# Patient Record
Sex: MALE | Race: WHITE | NOT HISPANIC OR LATINO | Employment: FULL TIME | URBAN - NONMETROPOLITAN AREA
[De-identification: names, ages, dates, MRNs, and addresses within clinical notes are randomized per-mention and may not be internally consistent; named-entity substitution may affect disease eponyms.]

---

## 2019-08-19 ENCOUNTER — OFFICE VISIT (OUTPATIENT)
Dept: OTOLARYNGOLOGY | Facility: CLINIC | Age: 59
End: 2019-08-19

## 2019-08-19 ENCOUNTER — CLINICAL SUPPORT (OUTPATIENT)
Dept: AUDIOLOGY | Facility: CLINIC | Age: 59
End: 2019-08-19

## 2019-08-19 VITALS — WEIGHT: 278.8 LBS | HEIGHT: 76 IN | TEMPERATURE: 97.8 F | BODY MASS INDEX: 33.95 KG/M2

## 2019-08-19 DIAGNOSIS — IMO0001 BILATERAL ASYMMETRIC SENSORINEURAL HEARING LOSS: Primary | ICD-10-CM

## 2019-08-19 DIAGNOSIS — H90.A22 SENSORINEURAL HEARING LOSS (SNHL) OF LEFT EAR WITH RESTRICTED HEARING OF RIGHT EAR: Primary | ICD-10-CM

## 2019-08-19 DIAGNOSIS — H93.13 TINNITUS OF BOTH EARS: ICD-10-CM

## 2019-08-19 DIAGNOSIS — H93.13 TINNITUS, BILATERAL: ICD-10-CM

## 2019-08-19 PROCEDURE — 99203 OFFICE O/P NEW LOW 30 MIN: CPT | Performed by: OTOLARYNGOLOGY

## 2019-08-19 PROCEDURE — 92567 TYMPANOMETRY: CPT | Performed by: AUDIOLOGIST

## 2019-08-19 PROCEDURE — 92557 COMPREHENSIVE HEARING TEST: CPT | Performed by: AUDIOLOGIST

## 2019-08-19 RX ORDER — PREDNISONE 20 MG/1
TABLET ORAL
Qty: 18 TABLET | Refills: 0 | Status: SHIPPED | OUTPATIENT
Start: 2019-08-19 | End: 2019-09-06
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
## 2019-08-19 NOTE — PATIENT INSTRUCTIONS
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
## 2019-09-06 ENCOUNTER — OFFICE VISIT (OUTPATIENT)
Dept: OTOLARYNGOLOGY | Facility: CLINIC | Age: 59
End: 2019-09-06

## 2019-09-06 ENCOUNTER — CLINICAL SUPPORT (OUTPATIENT)
Dept: AUDIOLOGY | Facility: CLINIC | Age: 59
End: 2019-09-06

## 2019-09-06 VITALS — WEIGHT: 278.4 LBS | HEIGHT: 76 IN | TEMPERATURE: 97.6 F | BODY MASS INDEX: 33.9 KG/M2

## 2019-09-06 DIAGNOSIS — H93.13 TINNITUS OF BOTH EARS: ICD-10-CM

## 2019-09-06 DIAGNOSIS — H90.A22 SENSORINEURAL HEARING LOSS (SNHL) OF LEFT EAR WITH RESTRICTED HEARING OF RIGHT EAR: Primary | ICD-10-CM

## 2019-09-06 DIAGNOSIS — IMO0001 BILATERAL ASYMMETRIC SENSORINEURAL HEARING LOSS: Primary | ICD-10-CM

## 2019-09-06 DIAGNOSIS — H93.13 TINNITUS, BILATERAL: ICD-10-CM

## 2019-09-06 PROCEDURE — 92567 TYMPANOMETRY: CPT | Performed by: AUDIOLOGIST

## 2019-09-06 PROCEDURE — 92557 COMPREHENSIVE HEARING TEST: CPT | Performed by: AUDIOLOGIST

## 2019-09-06 PROCEDURE — 99213 OFFICE O/P EST LOW 20 MIN: CPT | Performed by: OTOLARYNGOLOGY

## 2019-09-06 NOTE — PROGRESS NOTES
STANDARD AUDIOMETRIC EVALUATION      Name:  Beni Quezada  :  1960  Age:  58 y.o.  Date of Evaluation:  2019      HISTORY    Reason for visit:  Beni Quezada was seen today for a hearing evaluation at the request of Dr. Rashaad Latif.   Mr. Quezada was seen approximately two weeks ago with complaints of otitis media and aural pressure in the right ear and constant ringing tinnitus. Dr. Latif prescribed oral medication which reportedly improved Mr. Quezada's perceived hearing sensitivity bilaterally. He brought in a copy of his occupational hearing test that was completed in 2015. A review of the hearing test revealed no significant changes in hearing sensitivity when compared to the hearing test completed approximately two weeks ago. Mr. Quezada still suffers from constant ringing tinnitus in the left ear, but reported that it is not bothersome. No other significant audiologic information was reported.      EVALUATION    See Audiogram    RESULTS        Otoscopy and Tympanometry 226 Hz :  Right Ear:  Otoscopy:  Clear ear canal          Tympanometry:  Unable to test due to no seal    Left Ear:   Otoscopy:  Clear ear canal        Tympanometry:  Unable to test due to no seal    Test technique:  Standard Audiometry     Pure Tone Audiometry:   Patient responded to pure tones at 15-60 dB for 250-8000 Hz in right ear, and at 30-75 dB for 250-8000 Hz in left ear.       Speech Audiometry:        Right Ear:  Speech Reception Threshold (SRT) was obtained at 30 dBHL                 Speech Discrimination scores were 84% in quiet when words were presented at 70 dBHL       Left Ear:  Speech Reception Threshold (SRT) was obtained at 50 dBHL                 Speech Discrimination scores were 72% in quiet when words were presented at 75 dBHL the patient's MCL      Reliability:   excellent    IMPRESSIONS:  1.  Tympanometry results were not obtained due to the inability to get a seal bilaterally.  2.   Pure tone results are consistent with normal peripheral hearing sensitivity through 1000 Hz sloping to a moderately-severe sensorineural hearing loss in the right ear and a mild sloping to severe sensorineural hearing loss in the left ear. Asymmetries were noted at 250, 1000, 6000 and 8000 Hz with the left ear being the poorer ear. A very minimal improvement was seen when compared to previous test results.       RECOMMENDATIONS:  Patient is seeing the Ear Nose and Throat physician immediately following this examination.  It was a pleasure seeing Beni Quezada in Audiology today.  We would be happy to do further testing or discuss these test as necessary.              This document has been electronically signed by EMMY Warner on September 6, 2019 2:28 PM

## 2019-09-06 NOTE — PATIENT INSTRUCTIONS
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
## 2019-09-09 ENCOUNTER — LAB (OUTPATIENT)
Dept: LAB | Facility: HOSPITAL | Age: 59
End: 2019-09-09

## 2019-09-09 DIAGNOSIS — H93.13 TINNITUS, BILATERAL: ICD-10-CM

## 2019-09-09 DIAGNOSIS — H90.A22 SENSORINEURAL HEARING LOSS (SNHL) OF LEFT EAR WITH RESTRICTED HEARING OF RIGHT EAR: ICD-10-CM

## 2019-09-09 LAB
CREAT BLD-MCNC: 1.31 MG/DL (ref 0.76–1.27)
GFR SERPL CREATININE-BSD FRML MDRD: 56 ML/MIN/1.73

## 2019-09-09 PROCEDURE — 82565 ASSAY OF CREATININE: CPT

## 2019-09-09 PROCEDURE — 36415 COLL VENOUS BLD VENIPUNCTURE: CPT

## 2019-09-12 ENCOUNTER — HOSPITAL ENCOUNTER (OUTPATIENT)
Dept: MRI IMAGING | Facility: HOSPITAL | Age: 59
Discharge: HOME | End: 2019-09-12
Admitting: OTOLARYNGOLOGY

## 2019-09-12 DIAGNOSIS — H93.13 TINNITUS, BILATERAL: ICD-10-CM

## 2019-09-12 DIAGNOSIS — H90.A22 SENSORINEURAL HEARING LOSS (SNHL) OF LEFT EAR WITH RESTRICTED HEARING OF RIGHT EAR: ICD-10-CM

## 2019-09-12 PROCEDURE — A9576 INJ PROHANCE MULTIPACK: HCPCS | Performed by: OTOLARYNGOLOGY

## 2019-09-12 PROCEDURE — 25010000002 GADOTERIDOL PER 1 ML: Performed by: OTOLARYNGOLOGY

## 2019-09-12 PROCEDURE — 70553 MRI BRAIN STEM W/O & W/DYE: CPT

## 2019-09-12 RX ADMIN — GADOTERIDOL 20 ML: 279.3 INJECTION, SOLUTION INTRAVENOUS at 08:53

## 2019-09-13 ENCOUNTER — TELEPHONE (OUTPATIENT)
Dept: OTOLARYNGOLOGY | Facility: CLINIC | Age: 59
End: 2019-09-13

## 2019-09-13 RX ORDER — CEFDINIR 300 MG/1
300 CAPSULE ORAL 2 TIMES DAILY
Qty: 20 CAPSULE | Refills: 0 | Status: SHIPPED | OUTPATIENT
Start: 2019-09-13 | End: 2019-09-23

## 2019-09-13 NOTE — TELEPHONE ENCOUNTER
Spoke with patient and let him know that Dr. Latif had reviewed his MRI and that there is no tumor or anything like that to worry about, but that we have sent in an antibiotic for him to take because there was some inflammation seen.    He is to call our office if he has any questions or concerns before his next appointment.

## 2019-10-03 ENCOUNTER — OFFICE VISIT (OUTPATIENT)
Dept: OTOLARYNGOLOGY | Facility: CLINIC | Age: 59
End: 2019-10-03

## 2019-10-03 VITALS — WEIGHT: 279 LBS | HEIGHT: 76 IN | BODY MASS INDEX: 33.97 KG/M2 | TEMPERATURE: 97.4 F

## 2019-10-03 DIAGNOSIS — H90.A22 SENSORINEURAL HEARING LOSS (SNHL) OF LEFT EAR WITH RESTRICTED HEARING OF RIGHT EAR: Primary | ICD-10-CM

## 2019-10-03 DIAGNOSIS — H93.13 TINNITUS, BILATERAL: ICD-10-CM

## 2019-10-03 DIAGNOSIS — G93.0 CYST OF BRAIN: ICD-10-CM

## 2019-10-03 PROCEDURE — 99213 OFFICE O/P EST LOW 20 MIN: CPT | Performed by: OTOLARYNGOLOGY

## 2019-10-03 NOTE — PATIENT INSTRUCTIONS
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
## 2019-10-10 ENCOUNTER — CLINICAL SUPPORT (OUTPATIENT)
Dept: AUDIOLOGY | Facility: CLINIC | Age: 59
End: 2019-10-10

## 2019-10-10 DIAGNOSIS — IMO0001 BILATERAL ASYMMETRIC SENSORINEURAL HEARING LOSS: Primary | ICD-10-CM

## 2019-10-10 PROCEDURE — HEARINGNOCHG: Performed by: AUDIOLOGIST

## 2019-10-10 NOTE — PROGRESS NOTES
HEARING AID CONSULT    Name:  Beni Quezada  :  1960  Age:  58 y.o.  Date of Evaluation:  10/10/2019      HISTORY    Reason for visit:  Beni Quezada is seen today for a hearing aid consultation at the request of Dr. Rashaad Latif.  A previous audiogram completed on 2019 shows mild to moderate/severe sloping sensorineural hearing loss  for both ears. Asymmetries are present at 1000, 6000 and 8000 Hz with the left ear being the poorer ear, but Mr. Quezada has received medical clearance from Dr. Latif to move forward with amplification. Mr. Quezada currently works at [employer] and has significant difficulty hearing at work. He also notes difficulty hearing in the presence of background noise. Mr. Quezada has not worn hearing aids, but is interested in moving forward with bilateral amplification.    Hearing aid history:  Patient does not have hearing aids at this time.      RECOMMENDATIONS:  During the Hearing Aid discussion, Mr. Quezada reported that he is attempting to receive financial support for hearing aids through worker's compensation. He would also like us to contact his insurance to see if any coverage is provided through his insurance plan. Mr. Quezada is currently working and was given [employer] contact information to set up and appointment with a counselor. All of these plans will be contacted prior to the hearing aids being ordered to see which route works best for funding Mr. Quezada's hearing aids. He stated that he will let us know if he is going to receive worker's compensation or go through vocational rehabilitation. His order will be held until then. The hearing aids recommended are from [employer] with the M50 or M70 digital circuit. Mr. Quezada will decide on technology level when his funding options are presented. He also elected to move forward with the rechargeable option of hearing aids. We will wait to hear from him prior to moving  forward with the next step in the hearing aid fitting process.    Color: P6 Silver gray  2xM  R+L      It was a pleasure seeing Beni Quezada in Audiology today.  I look forward to helping Mr. Quezada with his amplification needs.                        This document has been electronically signed by EMMY Warner on October 10, 2019 3:36 PM

## 2019-11-05 ENCOUNTER — CLINICAL SUPPORT (OUTPATIENT)
Dept: AUDIOLOGY | Facility: CLINIC | Age: 59
End: 2019-11-05

## 2019-11-05 DIAGNOSIS — H90.3 SENSORINEURAL HEARING LOSS, BILATERAL: Primary | ICD-10-CM

## 2019-11-05 PROCEDURE — V5261 HEARING AID, DIGIT, BIN, BTE: HCPCS | Performed by: AUDIOLOGIST

## 2019-11-05 NOTE — PROGRESS NOTES
HEARING AID FITTING    Name:  Beni Quezada  :  1960  Age:  58 y.o.  Date of Evaluation:  2019      HISTORY    Reason for visit:  Beni Quezada was seen today for a hearing aid fitting.  The hearing aids fit were  in the Canal (SARY) hearing aids from [employer] with the JEDI MIND M70-R digital circuit.     Right Hearing Aid Serial Number: [id number]  Left Hearing Aid Serial Number: [id number]    Warranty Expiration:  's warranty extends through 2020 which covers repairs, loss and damage.    Battery Size:  rechargeable    The hearing aids were fit to Mr. Quezada's ears.  The feedback manager was completed and global gain was set to 90%. Mr. Quezada reported that his own voice sounded different, but overall the hearing aids were comfortable with good sound quality. The auto acclimatization manager was set to increase to 100% global gain over the next two weeks. Mr. Quezada was instructed on the use and care of the hearing instruments.  He demonstrated good ability of insertion and removal in office. The necessary paperwork was signed.  All questions were answered at this time.  The cost of this hearing aid is $2900.00 per aid for a total of $5800.00  Patient's insurance will be billed for the cost of the hearing aid(s). Prior to moving forward with ordering the hearing aids, Mr. Hoffmanns insurance reported that they would cover 85% per ear since his deductible had been met. The estimated cost of coverage is $4930.00 leaving Mr. Quezada with an $870.00 out of pocket cost. The $870.00 was collected at the time of the fitting through [employer] [facility].     Mr. Quezada will return in 2 weeks for a hearing aid follow up.  At that time we will make adjustments to the hearing aid(s) and address problems as necessary.      It was a pleasure seeing Beni Quezada in Audiology today.  It is a pleasure helping Mr. Quezada with his amplification needs.             This document has been  electronically signed by EMMY Warner on November 5, 2019 2:13 PM       For billing and coding: , binaural digital BTE fitting, $5800.00

## 2019-11-19 ENCOUNTER — CLINICAL SUPPORT (OUTPATIENT)
Dept: AUDIOLOGY | Facility: CLINIC | Age: 59
End: 2019-11-19

## 2019-11-19 DIAGNOSIS — H90.3 SENSORINEURAL HEARING LOSS, BILATERAL: Primary | ICD-10-CM

## 2019-11-19 PROCEDURE — HEARINGNOCHG: Performed by: AUDIOLOGIST

## 2019-11-19 NOTE — PROGRESS NOTES
"  HEARING AID CHECK    Name:  Beni Quezada  :  1960  Age:  58 y.o.  Date of Evaluation:  2019      HISTORY    Reason for visit:  Beni Quezada is seen today for a hearing aid check.  He was fit with [employer] [place] rechargeable hearing aids approximately two weeks ago. His auto acclimatization has increased to 100% global gain over the past two weeks. Mr. Quezada noted that approximately 6-7 days ago he started getting significant feedback when objects were near his hearing aids such as his fu or hand. He reported wiggling the dome in his ear could occasionally fix the issue. He reported good comfort of the hearing aids and good sound quality.     Hearing aid history:  Patient is currently wearing a  in the Canal (SARY) hearing aid in both ear(s).     OFFICE VISIT    During today's visit the hearing aids were connected to the software. Data logging revealed the hearing aids were being used approximately 16.7 hours per day. The domes were changed from a large vented dome to a large power dome. Mr. Quezada reported good comfort. The feedback manager was completed and \"whistleblock\" was increased from moderate to a strong setting. Mr. Quezada reported there was no longer feedback coming from the hearing aids when running his hands by them. He was counseled on possibly needing earmolds if this did not fix the issue in the future. Mr. Quezada reported that he is very pleased with the hearing aids and has no intentions on returning them. He was counseled on changing the wax trap and when it will be needed. He will return in three months for follow up or sooner if concerns arise.    It was a pleasure seeing Beni Quezada in Audiology today.  It is a pleasure helping Mr. Quezada with his amplification needs.             This document has been electronically signed by EMMY Warner on 2019 10:44 AM     For billing and coding: there is no charge for today's " appointment.

## 2020-01-14 ENCOUNTER — CLINICAL SUPPORT (OUTPATIENT)
Dept: AUDIOLOGY | Facility: CLINIC | Age: 60
End: 2020-01-14

## 2020-01-14 DIAGNOSIS — Z46.1 ENCOUNTER FOR FITTING OR ADJUSTMENT OF HEARING AID: Primary | ICD-10-CM

## 2020-01-14 PROCEDURE — 92593 PR HEARING AID CHECK BINAURAL: CPT | Performed by: AUDIOLOGIST

## 2020-01-15 NOTE — PROGRESS NOTES
HEARING AID CHECK    Name:  Beni Quezada  :  1960  Age:  59 y.o.  Date of Evaluation:  1/15/2020      HISTORY    Reason for visit:  Beni Quezada was seen today for a hearing aid check.  Patient reported the left one suddenly stopped working a few days ago.     Hearing aid history:  Patient is currently wearing a  in the Canal (SARY) hearing aid in both ear(s).     OFFICE VISIT     Upon visual inspection it was noted that Mr. Quezada's left hearing aid  was clogged with wax. He was counseled on cleaning the domes and changing the wax guard as the first step in troubleshooting if this problem occurred again. I attempted to change the wax trap in the left , but it was stuck inside and crooked. A new  was placed on the hearing aid at no charge. The right hearing aid was cleaned and checked. Mr. Quezada reported both were in good working order.    It was a pleasure seeing Beni Quezada in Audiology today.  It is a pleasure helping Mr. Quezada with his amplification needs.             This document has been electronically signed by EMMY Warner on January 15, 2020 8:23 AM

## 2020-02-18 ENCOUNTER — CLINICAL SUPPORT (OUTPATIENT)
Dept: AUDIOLOGY | Facility: CLINIC | Age: 60
End: 2020-02-18

## 2020-02-18 DIAGNOSIS — Z46.1 ENCOUNTER FOR FITTING OR ADJUSTMENT OF HEARING AID: Primary | ICD-10-CM

## 2020-02-18 PROCEDURE — 92593 PR HEARING AID CHECK BINAURAL: CPT | Performed by: AUDIOLOGIST

## 2020-02-18 NOTE — PROGRESS NOTES
HEARING AID CHECK    Name:  Beni Quezada  :  1960  Age:  59 y.o.  Date of Evaluation:  2020      HISTORY    Reason for visit:  Beni Quezada was seen today for a hearing aid check.  He reported that the left hearing aid cuts in and out and the right one seems more muffled compared to when he first received the hearing aids. Mr. Quezada reported that he can run his fingers over the microphones on the left hearing aid and hear the static, but the hearing aid is not amplifying anything else.     Hearing aid history:  Patient is currently wearing a  in the Canal (SARY) hearing aid in both ear(s).     OFFICE VISIT    During today's visit the left hearing aid was taken to send in for repair. The  was already changed for this issue and it was not resolved. The hearing aid will be fixed under warranty. Upon visual inspection it was noted that the microphones on the right hearing aid were clogged. Mr. Quezada was counseled on using his soft bristled toothbrush that he obtained for cleaning the hearing aids on the microphones as well. He was in good understanding of this. He will return to  the repaired hearing aid once it returns in office.    It was a pleasure seeing Beni Quezada in Audiology today.  It is a pleasure helping Mr. Quezada with his amplification needs.             This document has been electronically signed by EMMY Warner on 2020 11:41 AM

## 2020-03-02 ENCOUNTER — CLINICAL SUPPORT (OUTPATIENT)
Dept: AUDIOLOGY | Facility: CLINIC | Age: 60
End: 2020-03-02

## 2020-03-02 DIAGNOSIS — Z46.1 ENCOUNTER FOR FITTING OR ADJUSTMENT OF HEARING AID: Primary | ICD-10-CM

## 2020-03-02 PROCEDURE — 92591: CPT | Performed by: AUDIOLOGIST

## 2020-03-02 NOTE — PROGRESS NOTES
HEARING AID CHECK    Name:  Beni Quezada  :  1960  Age:  59 y.o.  Date of Evaluation:  3/2/2020      HISTORY    Reason for visit:  Beni Quezada was seen today to  his repaired left hearing aid. Essentially all components within the hearing aid were replaced.    Hearing aid history:  Patient is currently wearing a  in the Canal (SARY) hearing aid in both ear(s).     OFFICE VISIT    During today's visit both hearing aids were connected to the software to pair them. Mr. Quezada reported good sound quality. He will return in six months for a repeat hearing test and hearing aid check or sooner if concerns arise.    It was a pleasure seeing Beni Quezada in Audiology today.  It is a pleasure helping Mr. Quezada with his amplification needs.             This document has been electronically signed by EMMY Warner on 2020 9:34 AM

## 2020-04-03 ENCOUNTER — TELEPHONE (OUTPATIENT)
Dept: OTOLARYNGOLOGY | Facility: CLINIC | Age: 60
End: 2020-04-03

## 2020-04-03 NOTE — TELEPHONE ENCOUNTER
04/03/2020, 1333 - Patient telephoned per this staff member [phone].  Zero answer.  Voice message submitted with date, time, office contact information, and request to contact office at earliest convenience to reschedule clinical appointment.

## 2020-09-10 ENCOUNTER — CLINICAL SUPPORT (OUTPATIENT)
Dept: AUDIOLOGY | Facility: CLINIC | Age: 60
End: 2020-09-10

## 2020-09-10 DIAGNOSIS — H90.3 SENSORINEURAL HEARING LOSS, ASYMMETRICAL: Primary | ICD-10-CM

## 2020-09-10 PROCEDURE — 92567 TYMPANOMETRY: CPT | Performed by: AUDIOLOGIST

## 2020-09-10 PROCEDURE — 92557 COMPREHENSIVE HEARING TEST: CPT | Performed by: AUDIOLOGIST

## 2020-09-11 NOTE — PROGRESS NOTES
STANDARD AUDIOMETRIC EVALUATION      Name:  Beni Quezada  :  1960  Age:  59 y.o.  Date of Evaluation:  2020      HISTORY    Reason for visit:  Beni Quezada is seen today for a hearing test at the request of Yaritza Stovall.  Patient reports he needs his hearing tested and his hearing aids checked today.  He states the left hearing aid isn't working again.  He states he can't tell if his hearing has changed or not.       EVALUATION    See Audiogram    RESULTS        Otoscopy and Tympanometry 226 Hz :  Right Ear:  Otoscopy:  Clear ear canal          Tympanometry:  Negative middle ear pressure    Left Ear:   Otoscopy:  Clear ear canal        Tympanometry:  Middle ear function within normal limits    Test technique:  Standard Audiometry     Pure Tone Audiometry:   Patient responded to pure tones at 15-65 dB for 250-8000 Hz in right ear, and at 25-75 dB for 250-8000 Hz in left ear.       Speech Audiometry:        Right Ear:  Speech Reception Threshold (SRT) was obtained at 35 dBHL                 Speech Discrimination scores were 100% in quiet when words were presented at 75 dBHL       Left Ear:  Speech Reception Threshold (SRT) was obtained at 50 dBHL                 Speech Discrimination scores were 68% in quiet when words were presented at 75 dBHL    Reliability:   good    IMPRESSIONS:  1.  Tympanometry results are consistent with Negative middle ear pressure in right ear, and Middle ear function within normal limits in left ear.  2.  Pure tone results are consistent with mild to moderately severe sloping sensorineural hearing loss  for both ears, asymmetrically worse in left ear at 1000 Hz.       RECOMMENDATIONS:  Test results were reviewed with patient, and all questions were answered at this time.  It appears there haven't been any significant changes in thresholds since his last hearing test.  His hearing aids were cleaned and checked.  He was instructed on how to change the wax traps at  home.  He used the [employer] MROield Disk wax traps.  He will continue maintenance of hearing aids at home, and he will let me know if he needs any further assistance.  It was a pleasure seeing Beni Quezada in Audiology today.  We would be happy to do further testing or discuss these test as necessary.          This document has been electronically signed by Nahomy Huizar, MS MICHELLE-A on September 11, 2020 11:32       Nahomy Huizar MS CCC-A  Licensed Audiologist

## 2022-03-09 PROCEDURE — 87635 SARS-COV-2 COVID-19 AMP PRB: CPT | Performed by: NURSE PRACTITIONER

## 2022-06-22 ENCOUNTER — CLINICAL SUPPORT (OUTPATIENT)
Dept: AUDIOLOGY | Facility: CLINIC | Age: 62
End: 2022-06-22

## 2022-06-22 DIAGNOSIS — Z46.1 ENCOUNTER FOR FITTING OR ADJUSTMENT OF HEARING AID: Primary | ICD-10-CM

## 2022-06-22 PROCEDURE — HEARINGNOCHG: Performed by: AUDIOLOGIST

## 2022-06-22 NOTE — PROGRESS NOTES
HEARING AID CHECK    Name:  Beni Quezada  :  1960  Age:  61 y.o.  Date of Evaluation:  2022      HISTORY    Reason for visit:  Beni Quezada is seen today for a hearing aid problem.  Patient reports his right hearing aid quit a couple days ago.  He states the rechargeable battery in the right aid doesn't last longer than a few hours, and the other day it just quit charging.  He states sometimes the amplifier doesn't work in his left hearing aid.      Hearing aid history:  Patient is currently wearing a  in the Canal (SARY) hearing aid in both ears ear(s).     OFFICE VISIT    During today's visit right hearing aid could not be fixed in the office.  The right hearing aid, SN # [id number], will be sent to [facility] for repair and 1 year warranty.  The cost of the repair will be $280.00 which will be due at the time of .  He is also interested in purchasing 2 packs of [employer] wax traps at that time as well.    He will be contacted when the hearing aid returns to come pick it up.     It was a pleasure seeing Beni Quezada in Audiology today.  It is a pleasure helping Mr. Quezada with his amplification needs.             This document has been electronically signed by Nahomy Huizar MS CCC-A on 2022 11:44 CDT       Nahomy Huizar MS CCC-A  Licensed Audiologist    For Billing and Codin  Hearing Aid Check, Binaural - no charge